# Patient Record
Sex: FEMALE | Race: WHITE | NOT HISPANIC OR LATINO | Employment: OTHER | URBAN - METROPOLITAN AREA
[De-identification: names, ages, dates, MRNs, and addresses within clinical notes are randomized per-mention and may not be internally consistent; named-entity substitution may affect disease eponyms.]

---

## 2017-03-30 ENCOUNTER — HOSPITAL ENCOUNTER (OUTPATIENT)
Dept: LAB | Facility: HOSPITAL | Age: 82
Discharge: HOME | End: 2017-03-30
Attending: NURSE PRACTITIONER | Admitting: NURSE PRACTITIONER

## 2017-03-30 LAB
INR PPP: 1.7 (ref 2–3)
PROTHROMBIN TIME: 18.9 SEC (ref 19.4–28.5)

## 2018-02-06 ENCOUNTER — CONVERSION ENCOUNTER (OUTPATIENT)
Dept: SURGERY | Facility: CLINIC | Age: 83
End: 2018-02-06

## 2018-03-07 ENCOUNTER — CONVERSION ENCOUNTER (OUTPATIENT)
Dept: SURGERY | Facility: CLINIC | Age: 83
End: 2018-03-07

## 2018-04-30 ENCOUNTER — CONVERSION ENCOUNTER (OUTPATIENT)
Dept: SURGERY | Facility: CLINIC | Age: 83
End: 2018-04-30

## 2018-05-03 ENCOUNTER — HOSPITAL ENCOUNTER (OUTPATIENT)
Dept: NUCLEAR MEDICINE | Facility: HOSPITAL | Age: 83
Discharge: HOME | End: 2018-05-03
Attending: INTERNAL MEDICINE | Admitting: INTERNAL MEDICINE

## 2018-06-21 ENCOUNTER — CONVERSION ENCOUNTER (OUTPATIENT)
Dept: SURGERY | Facility: CLINIC | Age: 83
End: 2018-06-21

## 2018-09-05 ENCOUNTER — CONVERSION ENCOUNTER (OUTPATIENT)
Dept: SURGERY | Facility: CLINIC | Age: 83
End: 2018-09-05

## 2018-09-12 ENCOUNTER — CONVERSION ENCOUNTER (OUTPATIENT)
Dept: SURGERY | Facility: CLINIC | Age: 83
End: 2018-09-12

## 2018-09-22 ENCOUNTER — LAB REQUISITION (OUTPATIENT)
Dept: LAB | Facility: HOSPITAL | Age: 83
End: 2018-09-22

## 2018-09-22 DIAGNOSIS — Z00.00 ROUTINE GENERAL MEDICAL EXAMINATION AT A HEALTH CARE FACILITY: ICD-10-CM

## 2018-09-22 LAB
ANION GAP SERPL CALCULATED.3IONS-SCNC: 20.7 MMOL/L
BASOPHILS # BLD AUTO: 0.04 10*3/MM3 (ref 0–0.2)
BASOPHILS NFR BLD AUTO: 0.4 % (ref 0–1.5)
BUN BLD-MCNC: 30 MG/DL (ref 8–23)
BUN/CREAT SERPL: 7.4 (ref 7–25)
CALCIUM SPEC-SCNC: 9.1 MG/DL (ref 8.6–10.5)
CHLORIDE SERPL-SCNC: 92 MMOL/L (ref 98–107)
CO2 SERPL-SCNC: 28.3 MMOL/L (ref 22–29)
CREAT BLD-MCNC: 4.07 MG/DL (ref 0.57–1)
DEPRECATED RDW RBC AUTO: 56.1 FL (ref 37–54)
EOSINOPHIL # BLD AUTO: 0.12 10*3/MM3 (ref 0–0.7)
EOSINOPHIL NFR BLD AUTO: 1.3 % (ref 0.3–6.2)
ERYTHROCYTE [DISTWIDTH] IN BLOOD BY AUTOMATED COUNT: 14.5 % (ref 11.7–13)
GFR SERPL CREATININE-BSD FRML MDRD: 10 ML/MIN/1.73
GFR SERPL CREATININE-BSD FRML MDRD: ABNORMAL ML/MIN/1.73
GLUCOSE BLD-MCNC: 123 MG/DL (ref 65–99)
HCT VFR BLD AUTO: 42.7 % (ref 35.6–45.5)
HGB BLD-MCNC: 13.8 G/DL (ref 11.9–15.5)
IMM GRANULOCYTES # BLD: 0.03 10*3/MM3 (ref 0–0.03)
IMM GRANULOCYTES NFR BLD: 0.3 % (ref 0–0.5)
LYMPHOCYTES # BLD AUTO: 1.77 10*3/MM3 (ref 0.9–4.8)
LYMPHOCYTES NFR BLD AUTO: 19.7 % (ref 19.6–45.3)
MACROCYTES BLD QL SMEAR: NORMAL
MCH RBC QN AUTO: 34.4 PG (ref 26.9–32)
MCHC RBC AUTO-ENTMCNC: 32.3 G/DL (ref 32.4–36.3)
MCV RBC AUTO: 106.5 FL (ref 80.5–98.2)
MONOCYTES # BLD AUTO: 0.86 10*3/MM3 (ref 0.2–1.2)
MONOCYTES NFR BLD AUTO: 9.6 % (ref 5–12)
NEUTROPHILS # BLD AUTO: 6.18 10*3/MM3 (ref 1.9–8.1)
NEUTROPHILS NFR BLD AUTO: 69 % (ref 42.7–76)
PLAT MORPH BLD: NORMAL
PLATELET # BLD AUTO: 216 10*3/MM3 (ref 140–500)
PMV BLD AUTO: 10.2 FL (ref 6–12)
POTASSIUM BLD-SCNC: 3.9 MMOL/L (ref 3.5–5.2)
RBC # BLD AUTO: 4.01 10*6/MM3 (ref 3.9–5.2)
SODIUM BLD-SCNC: 141 MMOL/L (ref 136–145)
WBC MORPH BLD: NORMAL
WBC NRBC COR # BLD: 8.97 10*3/MM3 (ref 4.5–10.7)

## 2018-09-22 PROCEDURE — 85007 BL SMEAR W/DIFF WBC COUNT: CPT

## 2018-09-22 PROCEDURE — 85025 COMPLETE CBC W/AUTO DIFF WBC: CPT

## 2018-09-22 PROCEDURE — 80048 BASIC METABOLIC PNL TOTAL CA: CPT

## 2018-10-16 ENCOUNTER — CONVERSION ENCOUNTER (OUTPATIENT)
Dept: SURGERY | Facility: CLINIC | Age: 83
End: 2018-10-16

## 2018-10-30 ENCOUNTER — CONVERSION ENCOUNTER (OUTPATIENT)
Dept: SURGERY | Facility: CLINIC | Age: 83
End: 2018-10-30

## 2018-12-19 ENCOUNTER — HOSPITAL ENCOUNTER (EMERGENCY)
Facility: HOSPITAL | Age: 83
Discharge: HOME | End: 2018-12-19
Attending: EMERGENCY MEDICINE | Admitting: EMERGENCY MEDICINE

## 2018-12-19 VITALS
SYSTOLIC BLOOD PRESSURE: 149 MMHG | HEIGHT: 63 IN | TEMPERATURE: 97.3 F | RESPIRATION RATE: 16 BRPM | BODY MASS INDEX: 20.64 KG/M2 | HEART RATE: 72 BPM | DIASTOLIC BLOOD PRESSURE: 78 MMHG | WEIGHT: 116.5 LBS | OXYGEN SATURATION: 97 %

## 2018-12-19 DIAGNOSIS — Z51.89 VISIT FOR WOUND CHECK: Primary | ICD-10-CM

## 2018-12-19 PROBLEM — T82.898A HEMODIALYSIS AV FISTULA ANEURYSM, INITIAL ENCOUNTER (HCC): Status: ACTIVE | Noted: 2018-12-19

## 2018-12-19 LAB
ALBUMIN SERPL-MCNC: 3.6 G/DL (ref 3.5–5.2)
ALBUMIN/GLOB SERPL: 1.3 G/DL
ALP SERPL-CCNC: 94 U/L (ref 39–117)
ALT SERPL W P-5'-P-CCNC: 22 U/L (ref 1–33)
ANION GAP SERPL CALCULATED.3IONS-SCNC: 20 MMOL/L
AST SERPL-CCNC: 16 U/L (ref 1–32)
BASOPHILS # BLD AUTO: 0.01 10*3/MM3 (ref 0–0.2)
BASOPHILS NFR BLD AUTO: 0.1 % (ref 0–1.5)
BILIRUB SERPL-MCNC: 0.3 MG/DL (ref 0.1–1.2)
BUN BLD-MCNC: 106 MG/DL (ref 8–23)
BUN/CREAT SERPL: 15 (ref 7–25)
CALCIUM SPEC-SCNC: 9.5 MG/DL (ref 8.6–10.5)
CHLORIDE SERPL-SCNC: 90 MMOL/L (ref 98–107)
CO2 SERPL-SCNC: 25 MMOL/L (ref 22–29)
CREAT BLD-MCNC: 7.08 MG/DL (ref 0.57–1)
DEPRECATED RDW RBC AUTO: 50.1 FL (ref 37–54)
EOSINOPHIL # BLD AUTO: 0.01 10*3/MM3 (ref 0–0.7)
EOSINOPHIL NFR BLD AUTO: 0.1 % (ref 0.3–6.2)
ERYTHROCYTE [DISTWIDTH] IN BLOOD BY AUTOMATED COUNT: 13.5 % (ref 11.7–13)
GFR SERPL CREATININE-BSD FRML MDRD: 6 ML/MIN/1.73
GFR SERPL CREATININE-BSD FRML MDRD: ABNORMAL ML/MIN/1.73
GLOBULIN UR ELPH-MCNC: 2.7 GM/DL
GLUCOSE BLD-MCNC: 321 MG/DL (ref 65–99)
HCT VFR BLD AUTO: 34.9 % (ref 35.6–45.5)
HGB BLD-MCNC: 11.5 G/DL (ref 11.9–15.5)
IMM GRANULOCYTES # BLD: 0.02 10*3/MM3 (ref 0–0.03)
IMM GRANULOCYTES NFR BLD: 0.2 % (ref 0–0.5)
LYMPHOCYTES # BLD AUTO: 0.9 10*3/MM3 (ref 0.9–4.8)
LYMPHOCYTES NFR BLD AUTO: 8.1 % (ref 19.6–45.3)
MCH RBC QN AUTO: 33.6 PG (ref 26.9–32)
MCHC RBC AUTO-ENTMCNC: 33 G/DL (ref 32.4–36.3)
MCV RBC AUTO: 102 FL (ref 80.5–98.2)
MONOCYTES # BLD AUTO: 0.7 10*3/MM3 (ref 0.2–1.2)
MONOCYTES NFR BLD AUTO: 6.3 % (ref 5–12)
NEUTROPHILS # BLD AUTO: 9.56 10*3/MM3 (ref 1.9–8.1)
NEUTROPHILS NFR BLD AUTO: 85.4 % (ref 42.7–76)
PLATELET # BLD AUTO: 190 10*3/MM3 (ref 140–500)
PMV BLD AUTO: 10.4 FL (ref 6–12)
POTASSIUM BLD-SCNC: 4.3 MMOL/L (ref 3.5–5.2)
PROT SERPL-MCNC: 6.3 G/DL (ref 6–8.5)
RBC # BLD AUTO: 3.42 10*6/MM3 (ref 3.9–5.2)
SODIUM BLD-SCNC: 135 MMOL/L (ref 136–145)
WBC NRBC COR # BLD: 11.18 10*3/MM3 (ref 4.5–10.7)

## 2018-12-19 PROCEDURE — 85025 COMPLETE CBC W/AUTO DIFF WBC: CPT | Performed by: PHYSICIAN ASSISTANT

## 2018-12-19 PROCEDURE — 80053 COMPREHEN METABOLIC PANEL: CPT | Performed by: PHYSICIAN ASSISTANT

## 2018-12-19 PROCEDURE — 99283 EMERGENCY DEPT VISIT LOW MDM: CPT

## 2018-12-19 RX ORDER — SODIUM CHLORIDE 0.9 % (FLUSH) 0.9 %
10 SYRINGE (ML) INJECTION AS NEEDED
Status: DISCONTINUED | OUTPATIENT
Start: 2018-12-19 | End: 2018-12-19 | Stop reason: HOSPADM

## 2018-12-19 NOTE — ED PROVIDER NOTES
"EMERGENCY DEPARTMENT ENCOUNTER    Room Number:  27/27  Date seen:  12/19/2018  Time seen: 3:19 PM  PCP: System, Provider Not In    HPI:  Chief complaint: Fistula problem  Context:Talisha Morocho is a 85 y.o. female who presents to the ED after pt was unable to receive dialysis today as the nurse was concerned about the abnormal appearance of pt fistula in her L upper arm. Pt denies chest pain and SOA. Pt is a MWF dialysis patient. Pt had an angioplasty procedure on fistula yesterday in Dr. Mills's office.    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     Resolved Ambulatory Problems     Diagnosis Date Noted   • No Resolved Ambulatory Problems     Past Medical History:   Diagnosis Date   • Diabetes mellitus (CMS/HCC)    • End stage renal disease on dialysis (CMS/HCC)        PAST SURGICAL HISTORY  Past Surgical History:   Procedure Laterality Date   • ARTERIOVENOUS FISTULA Left    • ARTERIOVENOUS FISTULA/SHUNT SURGERY N/A 8/19/2016    Procedure: LEFT ARM AV FISTULA THROMBECTOMY, PROXIMAL CEPHALIC VEIN ANGIOPLASTY, INTERPOSITION GRAFT TO PROXIMAL FISTULA;  Surgeon: Parrish Burgess MD;  Location: [facility] 18/19;  Service:    • CATARACT EXTRACTION Bilateral    • CHOLECYSTECTOMY  2004   • FRACTURE SURGERY Left     \"left arm fracture repaired\"   • FRACTURE SURGERY Right     foot       FAMILY HISTORY  No family history on file.    SOCIAL HISTORY  Social History     Socioeconomic History   • Marital status:      Spouse name: Not on file   • Number of children: Not on file   • Years of education: Not on file   • Highest education level: Not on file   Social Needs   • Financial resource strain: " Not on file   • Food insecurity - worry: Not on file   • Food insecurity - inability: Not on file   • Transportation needs - medical: Not on file   • Transportation needs - non-medical: Not on file   Occupational History   • Not on file   Tobacco Use   • Smoking status: Never Smoker   • Smokeless tobacco: Never Used   Substance and Sexual Activity   • Alcohol use: No   • Drug use: No   • Sexual activity: Not on file   Other Topics Concern   • Not on file   Social History Narrative   • Not on file       REVIEW OF SYSTEMS  Review of Systems   Constitutional: Negative for fever.        Pt c/o fistula in L upper arm not working.   HENT: Negative for sore throat.    Eyes: Negative.    Respiratory: Negative for cough and shortness of breath.    Cardiovascular: Negative for chest pain.   Gastrointestinal: Negative for abdominal pain, diarrhea and vomiting.   Genitourinary: Negative for dysuria.   Musculoskeletal: Negative for neck pain.   Skin: Negative for rash.   Allergic/Immunologic: Negative.    Neurological: Negative for weakness, numbness and headaches.   Hematological: Negative.    Psychiatric/Behavioral: Negative.    All other systems reviewed and are negative.      PHYSICAL EXAM  ED Triage Vitals   Temp Heart Rate Resp BP SpO2   12/19/18 1506 12/19/18 1505 -- -- 12/19/18 1505   97.3 °F (36.3 °C) 78   98 %      Temp src Heart Rate Source Patient Position BP Location FiO2 (%)   12/19/18 1506 12/19/18 1505 -- -- --   Tympanic Monitor        Physical Exam   Constitutional: She is oriented to person, place, and time and well-developed, well-nourished, and in no distress. No distress.   HENT:   Head: Normocephalic and atraumatic.   Eyes: EOM are normal.   Neck: Normal range of motion.   Cardiovascular: Normal rate and regular rhythm.   Pulmonary/Chest: Effort normal and breath sounds normal. No respiratory distress.   Abdominal: Soft. There is no tenderness. There is no rebound and no guarding.   Musculoskeletal:    There is a suture in the L mid upper arm over fistula. There is thrill over fistula to palpation. Pt has a L AKA.   Neurological: She is alert and oriented to person, place, and time. She has normal sensation and normal strength.   Skin: Skin is warm and dry.   Psychiatric: Affect normal.   Nursing note and vitals reviewed.      LAB RESULTS  Recent Results (from the past 24 hour(s))   Comprehensive Metabolic Panel    Collection Time: 12/19/18  3:33 PM   Result Value Ref Range    Glucose 321 (H) 65 - 99 mg/dL     (H) 8 - 23 mg/dL    Creatinine 7.08 (H) 0.57 - 1.00 mg/dL    Sodium 135 (L) 136 - 145 mmol/L    Potassium 4.3 3.5 - 5.2 mmol/L    Chloride 90 (L) 98 - 107 mmol/L    CO2 25.0 22.0 - 29.0 mmol/L    Calcium 9.5 8.6 - 10.5 mg/dL    Total Protein 6.3 6.0 - 8.5 g/dL    Albumin 3.60 3.50 - 5.20 g/dL    ALT (SGPT) 22 1 - 33 U/L    AST (SGOT) 16 1 - 32 U/L    Alkaline Phosphatase 94 39 - 117 U/L    Total Bilirubin 0.3 0.1 - 1.2 mg/dL    eGFR Non African Amer 6 (L) >60 mL/min/1.73    eGFR  African Amer  >60 mL/min/1.73    Globulin 2.7 gm/dL    A/G Ratio 1.3 g/dL    BUN/Creatinine Ratio 15.0 7.0 - 25.0    Anion Gap 20.0 mmol/L   CBC Auto Differential    Collection Time: 12/19/18  3:33 PM   Result Value Ref Range    WBC 11.18 (H) 4.50 - 10.70 10*3/mm3    RBC 3.42 (L) 3.90 - 5.20 10*6/mm3    Hemoglobin 11.5 (L) 11.9 - 15.5 g/dL    Hematocrit 34.9 (L) 35.6 - 45.5 %    .0 (H) 80.5 - 98.2 fL    MCH 33.6 (H) 26.9 - 32.0 pg    MCHC 33.0 32.4 - 36.3 g/dL    RDW 13.5 (H) 11.7 - 13.0 %    RDW-SD 50.1 37.0 - 54.0 fl    MPV 10.4 6.0 - 12.0 fL    Platelets 190 140 - 500 10*3/mm3    Neutrophil % 85.4 (H) 42.7 - 76.0 %    Lymphocyte % 8.1 (L) 19.6 - 45.3 %    Monocyte % 6.3 5.0 - 12.0 %    Eosinophil % 0.1 (L) 0.3 - 6.2 %    Basophil % 0.1 0.0 - 1.5 %    Immature Grans % 0.2 0.0 - 0.5 %    Neutrophils, Absolute 9.56 (H) 1.90 - 8.10 10*3/mm3    Lymphocytes, Absolute 0.90 0.90 - 4.80 10*3/mm3    Monocytes, Absolute 0.70  "0.20 - 1.20 10*3/mm3    Eosinophils, Absolute 0.01 0.00 - 0.70 10*3/mm3    Basophils, Absolute 0.01 0.00 - 0.20 10*3/mm3    Immature Grans, Absolute 0.02 0.00 - 0.03 10*3/mm3       I ordered the above labs and reviewed the results    MEDICATIONS GIVEN IN ER  Medications   sodium chloride 0.9 % flush 10 mL (not administered)       PROGRESS AND CONSULTS    Progress Notes:  1525 Ordered CBC and CMP for further evaluation.    1532 Placed call to Moshe Mills MD, vascular surgery, for pt consult.    1548 Discussed pt with Dr. Burgess, on call for Dr. Mills who will evaluate pt fistula in ED.    1607 Dr. Burgess has evaluated pt at bedside, determined fistula appears well for dialysis, and will consult with NH then inform me of the plan for pt disposition.    1617 Dr. Burgess called back and stated pt is okay to be discharged back to the NH and receive dialysis tomorrow.    1623 Rechecked with pt, who is resting comfortably, and discussed plan to discharge pt back to nursing home to receive dialysis tomorrow. Pt understands and agrees with the plan, all questions answered.    1646 Reviewed pt's history and workup with Dr. Thibodeaux.  After a bedside evaluation; Dr. Thibodeaux agrees with the plan of care      Disposition vitals:  /78 (BP Location: Right arm, Patient Position: Lying)   Pulse 79   Temp 97.3 °F (36.3 °C) (Tympanic)   Resp 16   Ht 160 cm (63\")   Wt 52.8 kg (116 lb 8 oz)   SpO2 97%   BMI 20.64 kg/m²       DIAGNOSIS  Final diagnoses:   Visit for wound check     DISCHARGE    Patient discharged in stable condition.    Reviewed implications of results, diagnosis, meds, responsibility to follow up, warning signs and symptoms of possible worsening, potential complications and reasons to return to ER, including new or worsening sxs.    Patient/Family voiced understanding of above instructions.    Discussed plan for discharge, as there is no emergent indication for admission. Patient referred to primary " care provider for BP management due to today's BP. Pt/family is agreeable and understands need for follow up and repeat testing.  Pt is aware that discharge does not mean that nothing is wrong but it indicates no emergency is present that requires admission and they must continue care with follow-up as given below or physician of their choice.     FOLLOW-UP  Moshe Mills MD  [address] [address]  [phone]      As needed    Documentation assistance provided by kayleen Stevens for Gabriel Herrera PA-C.  Information recorded by the scribe was done at my direction and has been verified and validated by me.         Niharika Stevens  12/19/18 5020       Gabriel Herrera PA  12/19/18 7093

## 2018-12-19 NOTE — ED TRIAGE NOTES
Pt has fistula on left arm that is not working. Pt unable to get dialysis today. Fistula is swollen.

## 2018-12-19 NOTE — CONSULTS
"Name: Talisha Morocho ADMIT: 2018   : 1933  PCP: System, Provider Not In    MRN: [id number] LOS: 0 days   AGE/SEX: 85 y.o. female  ROOM:      Patient Care Team:  System, Provider Not In as PCP - General  Jules Smith PA as PCP - Claims Attributed  Chief Complaint   Patient presents with   • Vascular Access Problem     CC: Hemodialysis access evaluation.    Subjective     Consults  Jacqueline 85-year-old lady with end-stage renal disease currently has a left arm fistula in place.  She had recently suffered 2 infiltrations to the fistula that caused a moderate size hematoma was on the medial aspect of her left arm.  Yesterday she underwent diagnostic fistulogram by my partner, Dr. Mills, with angioplasties of stenoses.  There been a previous duplex that showed no flow in the hematomas on the medial aspect.  There was concern that her dialysis center for choosing to [facility] for vascular evaluation.      Review of Systems   Unable to perform ROS: Dementia       Past Medical History:   Diagnosis Date   • Diabetes mellitus (CMS/[facility])    • End stage renal disease on dialysis (CMS/[facility])      Past Surgical History:   Procedure Laterality Date   • ARTERIOVENOUS FISTULA Left    • ARTERIOVENOUS FISTULA/SHUNT SURGERY N/A 2016    Procedure: LEFT ARM AV FISTULA THROMBECTOMY, PROXIMAL CEPHALIC VEIN ANGIOPLASTY, INTERPOSITION GRAFT TO PROXIMAL FISTULA;  Surgeon: Parrish Burgess MD;  Location: [facility] ;  Service:    • CATARACT EXTRACTION Bilateral    • CHOLECYSTECTOMY     • FRACTURE SURGERY Left     \"left arm fracture repaired\"   • FRACTURE SURGERY Right     foot     No family history on file.    Social History     Tobacco Use   • Smoking status: Never Smoker   • Smokeless tobacco: Never Used   Substance Use Topics   • Alcohol use: No   • Drug use: No       (Not in a hospital admission)        •  [COMPLETED] Insert peripheral IV **AND** sodium chloride  Amoxicillin; " "Ampicillin; Bee venom; Fish allergy; Metformin; Other; Shellfish allergy; and Sulfamethoxazole-trimethoprim    Objective     Physical Exam:  Physical Exam   Constitutional: She appears well-developed and well-nourished.   HENT:   Head: Normocephalic and atraumatic.   Eyes: EOM are normal. Pupils are equal, round, and reactive to light.   Neck: Normal range of motion. Neck supple.   Cardiovascular: Normal rate.   Irregular heart rhythm.    Wonderful thrill and bruit in left upper extremity graft.   Pulmonary/Chest: Effort normal and breath sounds normal.   Abdominal: Soft. Bowel sounds are normal.   Musculoskeletal: Normal range of motion. She exhibits edema.   Neurological: She is alert.   Unable to answer detailed questions.   Skin: Skin is warm and dry. Capillary refill takes less than 2 seconds.   Psychiatric: She has a normal mood and affect. Her behavior is normal.       Vital Signs and Labs:  Vital Signs   Patient Vitals for the past 24 hrs:   BP Temp Temp src Pulse Resp SpO2 Height Weight   12/19/18 1525 156/78 -- -- 79 16 97 % 160 cm (63\") 52.8 kg (116 lb 8 oz)   12/19/18 1506 -- 97.3 °F (36.3 °C) Tympanic -- -- -- -- --   12/19/18 1505 -- -- -- 78 -- 98 % -- --     I/O:  No intake/output data recorded.    CBC    Results from last 7 days   Lab Units  12/19/18   1533   WBC 10*3/mm3  11.18*   HEMOGLOBIN g/dL  11.5*   PLATELETS 10*3/mm3  190     BMP   Results from last 7 days   Lab Units  12/19/18   1533   SODIUM mmol/L  135*   POTASSIUM mmol/L  4.3   CHLORIDE mmol/L  90*   CO2 mmol/L  25.0   BUN mg/dL  106*   CREATININE mg/dL  7.08*   GLUCOSE mg/dL  321*     Cr Clearance Estimated Creatinine Clearance: 4.8 mL/min (A) (by C-G formula based on SCr of 7.08 mg/dL (H)).  Coag     HbA1C No results found for: HGBA1C  Blood Glucose No results found for: POCGLU  Infection     CMP   Results from last 7 days   Lab Units  12/19/18   1533   SODIUM mmol/L  135*   POTASSIUM mmol/L  4.3   CHLORIDE mmol/L  90*   CO2 mmol/L  " 25.0   BUN mg/dL  106*   CREATININE mg/dL  7.08*   GLUCOSE mg/dL  321*   ALBUMIN g/dL  3.60   BILIRUBIN mg/dL  0.3   ALK PHOS U/L  94   AST (SGOT) U/L  16   ALT (SGPT) U/L  22     ABG      Radiology(recent) No radiology results for the last day    Active Hospital Problems    Diagnosis Date Noted   • Hemodialysis AV fistula aneurysm, initial encounter (CMS/[facility]) [T82.898A] 12/19/2018      Resolved Hospital Problems   No resolved problems to display.     Problem Points:  3:  Patient has a new problem, with no additional work-up planned (max of 1)  Total problem points:3    Data Points:  1:  I have reviewed or order clinical lab test  1:  I have reviewed or order radiology test (except heart catheterization or echo)  Total data points:2    Risk Points:  Moderate:  Chronic illness with mild exacerbation    MDM requires 2/3 (Problem points, Data points and Risk)  MDM Prob point Data point Risk   SF 1 1 Minimal   Low 2 2 Low   Mod 3 3 Moderate   High 4 4 High     Code requires 3/3 (MDM, History and Exam)  Code MDM History Exam Time   53279 SF/Low Detailed Detailed 30   84985 Mod Comprehensive Comprehensive 50   79630 High Comprehensive Comprehensive 70     Detailed history:  4 elements HPI or status of 3 chronic problems; 2-9 system ROS  Comprehensive:  4 elements HPI or status of 3 chronic problems;  10 system ROS    Detailed Exam:  12 findings from any organ system  Comprehensive Exam:  2 findings from each of 9 systems.   69320    Assessment/Plan       Hemodialysis AV fistula aneurysm, initial encounter (CMS/[facility])      85 y.o. female patient sent from her dialysis center for concerns.  I called and spoke with bath at the center.  There is some concerns about the medial bruising.  I don't think this is infected and per report this is stable.  I think the patient can be discharged from the emergency department from my standpoint.  I discussed with bath that accessing the aneurysmal portion near the antecubital fossa  outside of the areas of previous infiltration should be pursued.  And then as the infiltration resolves needle access can be removed other areas of the fistula.  She keep her scheduled follow-up with us in the office.  Please call with further questions.    I discussed the patients findings and my recommendations with patient, family and Emergency department provider and dialysis center representative..    Parrish Burgess MD  12/19/18  4:35 PM    Please call my office with any question: [phone]

## 2018-12-19 NOTE — ED PROVIDER NOTES
MD ATTESTATION NOTE    Pt presents to the ED after pt was unable to get dialysis d/t the RN being concerned about an abnormal appearance of the fistula located in her left upper arm. Pt reports that Dr. Mills did an angioplasty procedure yesterday. Pt's family reports that the patient has had her fistula in place since 2007. Pt denies numbness/tingling of her left digits, any pain, or shortness of breath. Pt has a hx of end stage renal disease and diabetes. Pt is a MWF dialysis patient.    On exam, Cardio is RRR. No respiratory distress. Oxygen saturation is currently 98% on RA. There is a fistula in her left upper arm with a suture in place. There is no redness or warmth. There is a palpable thrill. There is normal sensation of the left arm and left hand. BP is currently 141/81.    Reviewed pt's lab results [WBC = 11.18 and Potassium = 4.3]. Dr. Burgess has evaluated the patient in the ED. I agree with the plan to discharge the patient home with instructions to get dialysis tomorrow.    The NITISH and I have discussed this patient's history, physical exam, and treatment plan.  I have reviewed the documentation and personally had a face to face interaction with the patient. I affirm the documentation and agree with the treatment and plan.  The attached note describes my personal findings.      Documentation assistance provided by kayleen Estrada for Dr. Carlos Eduardo MD. Information recorded by the scribsarah was done at my direction and has been verified and validated by me.     Melida Estrada  12/19/18 6023       Damaso Thibodeaux MD  12/19/18 203

## 2018-12-20 ENCOUNTER — CONVERSION ENCOUNTER (OUTPATIENT)
Dept: SURGERY | Facility: CLINIC | Age: 83
End: 2018-12-20

## 2019-01-08 ENCOUNTER — CONVERSION ENCOUNTER (OUTPATIENT)
Dept: SURGERY | Facility: CLINIC | Age: 84
End: 2019-01-08

## 2019-01-24 ENCOUNTER — HOSPITAL ENCOUNTER (OUTPATIENT)
Dept: OTHER | Facility: HOSPITAL | Age: 84
Discharge: HOME | End: 2019-01-24
Attending: SURGERY | Admitting: SURGERY

## 2019-02-12 ENCOUNTER — LAB REQUISITION (OUTPATIENT)
Dept: LAB | Facility: HOSPITAL | Age: 84
End: 2019-02-12

## 2019-02-12 DIAGNOSIS — Z00.00 ENCOUNTER FOR GENERAL ADULT MEDICAL EXAMINATION WITHOUT ABNORMAL FINDINGS: ICD-10-CM

## 2019-02-12 PROCEDURE — 87186 SC STD MICRODIL/AGAR DIL: CPT | Performed by: SURGERY

## 2019-02-12 PROCEDURE — 87205 SMEAR GRAM STAIN: CPT | Performed by: SURGERY

## 2019-02-12 PROCEDURE — 87070 CULTURE OTHR SPECIMN AEROBIC: CPT | Performed by: SURGERY

## 2019-02-12 PROCEDURE — 87147 CULTURE TYPE IMMUNOLOGIC: CPT | Performed by: SURGERY

## 2019-02-14 LAB
BACTERIA SPEC AEROBE CULT: ABNORMAL
GRAM STN SPEC: ABNORMAL
GRAM STN SPEC: ABNORMAL

## 2019-03-07 ENCOUNTER — HOSPITAL ENCOUNTER (OUTPATIENT)
Dept: OTHER | Facility: HOSPITAL | Age: 84
Discharge: HOME | End: 2019-03-07
Attending: SURGERY | Admitting: SURGERY

## 2019-04-09 ENCOUNTER — CONVERSION ENCOUNTER (OUTPATIENT)
Dept: SURGERY | Facility: CLINIC | Age: 84
End: 2019-04-09

## 2019-06-04 VITALS
WEIGHT: 109 LBS | DIASTOLIC BLOOD PRESSURE: 66 MMHG | OXYGEN SATURATION: 99 % | HEART RATE: 82 BPM | DIASTOLIC BLOOD PRESSURE: 66 MMHG | OXYGEN SATURATION: 98 % | HEIGHT: 63 IN | BODY MASS INDEX: 19.31 KG/M2 | HEART RATE: 82 BPM | HEART RATE: 66 BPM | RESPIRATION RATE: 18 BRPM | DIASTOLIC BLOOD PRESSURE: 87 MMHG | HEIGHT: 63 IN | DIASTOLIC BLOOD PRESSURE: 72 MMHG | HEIGHT: 63 IN | SYSTOLIC BLOOD PRESSURE: 161 MMHG | SYSTOLIC BLOOD PRESSURE: 144 MMHG | BODY MASS INDEX: 19.31 KG/M2 | BODY MASS INDEX: 20.64 KG/M2 | BODY MASS INDEX: 21.26 KG/M2 | HEART RATE: 70 BPM | BODY MASS INDEX: 20.64 KG/M2 | SYSTOLIC BLOOD PRESSURE: 158 MMHG | SYSTOLIC BLOOD PRESSURE: 155 MMHG | HEART RATE: 64 BPM | HEIGHT: 63 IN | OXYGEN SATURATION: 99 % | HEIGHT: 63 IN | SYSTOLIC BLOOD PRESSURE: 123 MMHG | HEART RATE: 84 BPM | SYSTOLIC BLOOD PRESSURE: 149 MMHG | DIASTOLIC BLOOD PRESSURE: 76 MMHG | WEIGHT: 120 LBS | WEIGHT: 109 LBS | WEIGHT: 109 LBS | HEART RATE: 66 BPM | BODY MASS INDEX: 19.31 KG/M2 | RESPIRATION RATE: 18 BRPM | HEIGHT: 63 IN | OXYGEN SATURATION: 95 % | DIASTOLIC BLOOD PRESSURE: 81 MMHG | OXYGEN SATURATION: 98 % | DIASTOLIC BLOOD PRESSURE: 71 MMHG | SYSTOLIC BLOOD PRESSURE: 137 MMHG | BODY MASS INDEX: 19.31 KG/M2 | HEIGHT: 63 IN | OXYGEN SATURATION: 100 %

## 2019-06-04 VITALS
HEART RATE: 77 BPM | BODY MASS INDEX: 21.26 KG/M2 | HEIGHT: 63 IN | BODY MASS INDEX: 20.7 KG/M2 | DIASTOLIC BLOOD PRESSURE: 56 MMHG | BODY MASS INDEX: 18.96 KG/M2 | SYSTOLIC BLOOD PRESSURE: 138 MMHG | DIASTOLIC BLOOD PRESSURE: 75 MMHG | HEART RATE: 91 BPM | HEIGHT: 63 IN | WEIGHT: 120 LBS | DIASTOLIC BLOOD PRESSURE: 70 MMHG | OXYGEN SATURATION: 99 % | SYSTOLIC BLOOD PRESSURE: 121 MMHG | HEART RATE: 76 BPM | DIASTOLIC BLOOD PRESSURE: 84 MMHG | BODY MASS INDEX: 21.26 KG/M2 | WEIGHT: 120 LBS | HEART RATE: 95 BPM | WEIGHT: 107 LBS | WEIGHT: 116.8 LBS | HEIGHT: 63 IN | SYSTOLIC BLOOD PRESSURE: 139 MMHG | RESPIRATION RATE: 16 BRPM | RESPIRATION RATE: 18 BRPM | SYSTOLIC BLOOD PRESSURE: 98 MMHG | OXYGEN SATURATION: 99 % | HEIGHT: 63 IN | OXYGEN SATURATION: 99 %